# Patient Record
Sex: MALE | Race: WHITE | Employment: OTHER | URBAN - METROPOLITAN AREA
[De-identification: names, ages, dates, MRNs, and addresses within clinical notes are randomized per-mention and may not be internally consistent; named-entity substitution may affect disease eponyms.]

---

## 2017-02-10 DIAGNOSIS — G47.00 INSOMNIA, PERSISTENT: ICD-10-CM

## 2017-02-10 DIAGNOSIS — M25.512 ARTHRALGIA OF SHOULDER REGION, LEFT: ICD-10-CM

## 2017-02-10 DIAGNOSIS — M47.27 LUMBOSACRAL RADICULOPATHY DUE TO OSTEOARTHRITIS OF SPINE: ICD-10-CM

## 2017-02-10 RX ORDER — OXYCODONE HYDROCHLORIDE AND ACETAMINOPHEN 5; 325 MG/1; MG/1
1 TABLET ORAL 2 TIMES DAILY PRN
Qty: 60 TABLET | Refills: 0 | Status: SHIPPED | OUTPATIENT
Start: 2017-02-10 | End: 2017-03-12

## 2017-02-10 RX ORDER — ZOLPIDEM TARTRATE 5 MG/1
5 TABLET ORAL NIGHTLY PRN
Qty: 30 TABLET | Refills: 1 | Status: SHIPPED | OUTPATIENT
Start: 2017-02-10 | End: 2017-03-29 | Stop reason: SDUPTHER

## 2017-03-29 ENCOUNTER — OFFICE VISIT (OUTPATIENT)
Dept: FAMILY MEDICINE CLINIC | Age: 82
End: 2017-03-29

## 2017-03-29 DIAGNOSIS — K21.9 GASTROESOPHAGEAL REFLUX DISEASE WITHOUT ESOPHAGITIS: Primary | ICD-10-CM

## 2017-03-29 DIAGNOSIS — R53.81 OTHER MALAISE AND FATIGUE: ICD-10-CM

## 2017-03-29 DIAGNOSIS — M19.012 OSTEOARTHRITIS OF BOTH SHOULDERS, UNSPECIFIED OSTEOARTHRITIS TYPE: ICD-10-CM

## 2017-03-29 DIAGNOSIS — E55.9 VITAMIN D DEFICIENCY: ICD-10-CM

## 2017-03-29 DIAGNOSIS — R21 RASH: ICD-10-CM

## 2017-03-29 DIAGNOSIS — R53.83 OTHER MALAISE AND FATIGUE: ICD-10-CM

## 2017-03-29 DIAGNOSIS — G47.00 INSOMNIA, PERSISTENT: ICD-10-CM

## 2017-03-29 DIAGNOSIS — M19.011 OSTEOARTHRITIS OF BOTH SHOULDERS, UNSPECIFIED OSTEOARTHRITIS TYPE: ICD-10-CM

## 2017-03-29 DIAGNOSIS — L50.9 URTICARIA: ICD-10-CM

## 2017-03-29 LAB
T4 FREE: 1.21 NG/DL (ref 0.93–1.7)
TSH SERPL DL<=0.05 MIU/L-ACNC: 1.31 UIU/ML (ref 0.27–4.2)
VITAMIN B-12: 814 PG/ML (ref 211–946)
VITAMIN D 25-HYDROXY: 29.1 NG/ML (ref 30–100)

## 2017-03-29 PROCEDURE — G8420 CALC BMI NORM PARAMETERS: HCPCS | Performed by: FAMILY MEDICINE

## 2017-03-29 PROCEDURE — 1036F TOBACCO NON-USER: CPT | Performed by: FAMILY MEDICINE

## 2017-03-29 PROCEDURE — G8427 DOCREV CUR MEDS BY ELIG CLIN: HCPCS | Performed by: FAMILY MEDICINE

## 2017-03-29 PROCEDURE — G8484 FLU IMMUNIZE NO ADMIN: HCPCS | Performed by: FAMILY MEDICINE

## 2017-03-29 PROCEDURE — 4040F PNEUMOC VAC/ADMIN/RCVD: CPT | Performed by: FAMILY MEDICINE

## 2017-03-29 PROCEDURE — 99214 OFFICE O/P EST MOD 30 MIN: CPT | Performed by: FAMILY MEDICINE

## 2017-03-29 PROCEDURE — 1123F ACP DISCUSS/DSCN MKR DOCD: CPT | Performed by: FAMILY MEDICINE

## 2017-03-29 RX ORDER — ZOLPIDEM TARTRATE 5 MG/1
5 TABLET ORAL NIGHTLY PRN
Qty: 30 TABLET | Refills: 1 | Status: SHIPPED | OUTPATIENT
Start: 2017-03-29

## 2017-03-29 RX ORDER — OXYCODONE HYDROCHLORIDE AND ACETAMINOPHEN 5; 325 MG/1; MG/1
1 TABLET ORAL 2 TIMES DAILY PRN
COMMUNITY

## 2017-03-29 RX ORDER — OXYCODONE HYDROCHLORIDE AND ACETAMINOPHEN 5; 325 MG/1; MG/1
1 TABLET ORAL 2 TIMES DAILY PRN
Status: CANCELLED | OUTPATIENT
Start: 2017-03-29

## 2017-03-29 RX ORDER — ZOLPIDEM TARTRATE 5 MG/1
5 TABLET ORAL NIGHTLY PRN
Qty: 30 TABLET | Refills: 1 | Status: CANCELLED | OUTPATIENT
Start: 2017-03-29

## 2017-03-29 ASSESSMENT — PATIENT HEALTH QUESTIONNAIRE - PHQ9
SUM OF ALL RESPONSES TO PHQ9 QUESTIONS 1 & 2: 0
SUM OF ALL RESPONSES TO PHQ QUESTIONS 1-9: 0
2. FEELING DOWN, DEPRESSED OR HOPELESS: 0
1. LITTLE INTEREST OR PLEASURE IN DOING THINGS: 0

## 2017-03-29 ASSESSMENT — ENCOUNTER SYMPTOMS: BACK PAIN: 1

## 2017-04-01 LAB
ALLERGEN ASPERGILLUS ALTERNATA IGE: <0.1 KU/L
ALLERGEN ASPERGILLUS FUMIGATUS IGE: <0.1 KU/L
ALLERGEN BARLEY IGE: <0.1 KU/L
ALLERGEN BEEF: <0.1 KU/L
ALLERGEN CABBAGE IGE: <0.1 KU/L
ALLERGEN CANDIDA ALBICANS: 0.14 KU/L
ALLERGEN CARROT IGE: <0.1 KU/L
ALLERGEN CHICKEN IGE: <0.1 KU/L
ALLERGEN CODFISH IGE: <0.1 KU/L
ALLERGEN CORN IGE: <0.1 KU/L
ALLERGEN COW MILK IGE: <0.1 KU/L
ALLERGEN CRAB IGE: <0.1 KU/L
ALLERGEN EGG WHITE IGE: <0.1 KU/L
ALLERGEN GRAPE IGE: <0.1 KU/L
ALLERGEN HORMODENDRUM HORDEI IGE: <0.1 KU/L
ALLERGEN LETTUCE IGE: <0.1 KU/L
ALLERGEN NAVY BEAN: <0.1 KU/L
ALLERGEN OAT: <0.1 KU/L
ALLERGEN ORANGE IGE: <0.1 KU/L
ALLERGEN PEANUT (F13) IGE: <0.1 KU/L
ALLERGEN PENICILLIUM NOTATUM: <0.1 KU/L
ALLERGEN PORK: <0.1 KU/L
ALLERGEN POTATO IGE: <0.1 KU/L
ALLERGEN RICE IGE: <0.1 KU/L
ALLERGEN RYE IGE: <0.1 KU/L
ALLERGEN SEE NOTE: NORMAL
ALLERGEN SHRIMP IGE: <0.1 KU/L
ALLERGEN SOYBEAN IGE: <0.1 KU/L
ALLERGEN TOMATO IGE: <0.1 KU/L
ALLERGEN TUNA IGE: <0.1 KU/L
ALLERGEN WHEAT IGE: <0.1 KU/L
ALLERGEN, BELL PEPPER: <0.1 KU/L

## 2017-04-02 VITALS
DIASTOLIC BLOOD PRESSURE: 88 MMHG | OXYGEN SATURATION: 97 % | HEIGHT: 68 IN | BODY MASS INDEX: 25.78 KG/M2 | TEMPERATURE: 96.9 F | WEIGHT: 170.13 LBS | SYSTOLIC BLOOD PRESSURE: 138 MMHG | HEART RATE: 91 BPM

## 2017-04-05 DIAGNOSIS — E55.9 HYPOVITAMINOSIS D: ICD-10-CM

## 2017-04-05 RX ORDER — ERGOCALCIFEROL 1.25 MG/1
CAPSULE ORAL
Qty: 6 CAPSULE | Refills: 3 | Status: SHIPPED | OUTPATIENT
Start: 2017-04-05

## 2024-11-11 ENCOUNTER — APPOINTMENT (OUTPATIENT)
Dept: RADIOLOGY | Facility: HOSPITAL | Age: 89
End: 2024-11-11
Payer: MEDICARE

## 2024-11-11 ENCOUNTER — HOSPITAL ENCOUNTER (EMERGENCY)
Facility: HOSPITAL | Age: 89
Discharge: HOME | End: 2024-11-11
Attending: EMERGENCY MEDICINE
Payer: MEDICARE

## 2024-11-11 ENCOUNTER — HOSPITAL ENCOUNTER (EMERGENCY)
Facility: HOSPITAL | Age: 89
Discharge: HOME | End: 2024-11-11
Payer: MEDICARE

## 2024-11-11 VITALS
DIASTOLIC BLOOD PRESSURE: 74 MMHG | OXYGEN SATURATION: 97 % | RESPIRATION RATE: 16 BRPM | SYSTOLIC BLOOD PRESSURE: 126 MMHG | HEART RATE: 97 BPM

## 2024-11-11 VITALS
RESPIRATION RATE: 20 BRPM | SYSTOLIC BLOOD PRESSURE: 138 MMHG | DIASTOLIC BLOOD PRESSURE: 72 MMHG | HEART RATE: 85 BPM | OXYGEN SATURATION: 100 %

## 2024-11-11 DIAGNOSIS — Z51.89 VISIT FOR WOUND CHECK: Primary | ICD-10-CM

## 2024-11-11 DIAGNOSIS — S12.101A CLOSED NONDISPLACED FRACTURE OF SECOND CERVICAL VERTEBRA, UNSPECIFIED FRACTURE MORPHOLOGY, INITIAL ENCOUNTER: ICD-10-CM

## 2024-11-11 DIAGNOSIS — S01.01XA LACERATION OF SCALP, INITIAL ENCOUNTER: ICD-10-CM

## 2024-11-11 DIAGNOSIS — S09.90XA HEAD INJURY, INITIAL ENCOUNTER: Primary | ICD-10-CM
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
## 2024-11-11 LAB
ALBUMIN SERPL BCP-MCNC: 4.3 G/DL (ref 3.4–5)
ALP SERPL-CCNC: 56 U/L (ref 33–136)
ALT SERPL W P-5'-P-CCNC: 14 U/L (ref 10–52)
ANION GAP SERPL CALC-SCNC: 12 MMOL/L (ref 10–20)
APTT PPP: 32 SECONDS (ref 27–38)
AST SERPL W P-5'-P-CCNC: 20 U/L (ref 9–39)
BASOPHILS # BLD AUTO: 0.04 X10*3/UL (ref 0–0.1)
BASOPHILS NFR BLD AUTO: 0.5 %
BILIRUB SERPL-MCNC: 0.7 MG/DL (ref 0–1.2)
BUN SERPL-MCNC: 41 MG/DL (ref 6–23)
CALCIUM SERPL-MCNC: 8.9 MG/DL (ref 8.6–10.3)
CHLORIDE SERPL-SCNC: 101 MMOL/L (ref 98–107)
CO2 SERPL-SCNC: 29 MMOL/L (ref 21–32)
CREAT SERPL-MCNC: 1.67 MG/DL (ref 0.5–1.3)
EGFRCR SERPLBLD CKD-EPI 2021: 39 ML/MIN/1.73M*2
EOSINOPHIL # BLD AUTO: 0.19 X10*3/UL (ref 0–0.4)
EOSINOPHIL NFR BLD AUTO: 2.6 %
ERYTHROCYTE [DISTWIDTH] IN BLOOD BY AUTOMATED COUNT: 14.8 % (ref 11.5–14.5)
GLUCOSE SERPL-MCNC: 123 MG/DL (ref 74–99)
HCT VFR BLD AUTO: 38.9 % (ref 41–52)
HGB BLD-MCNC: 12.6 G/DL (ref 13.5–17.5)
IMM GRANULOCYTES # BLD AUTO: 0.11 X10*3/UL (ref 0–0.5)
IMM GRANULOCYTES NFR BLD AUTO: 1.5 % (ref 0–0.9)
INR PPP: 1.1 (ref 0.9–1.1)
LYMPHOCYTES # BLD AUTO: 1.16 X10*3/UL (ref 0.8–3)
LYMPHOCYTES NFR BLD AUTO: 15.8 %
MCH RBC QN AUTO: 30.6 PG (ref 26–34)
MCHC RBC AUTO-ENTMCNC: 32.4 G/DL (ref 32–36)
MCV RBC AUTO: 94 FL (ref 80–100)
MONOCYTES # BLD AUTO: 0.78 X10*3/UL (ref 0.05–0.8)
MONOCYTES NFR BLD AUTO: 10.6 %
NEUTROPHILS # BLD AUTO: 5.05 X10*3/UL (ref 1.6–5.5)
NEUTROPHILS NFR BLD AUTO: 69 %
NRBC BLD-RTO: 0 /100 WBCS (ref 0–0)
PLATELET # BLD AUTO: 121 X10*3/UL (ref 150–450)
POTASSIUM SERPL-SCNC: 4 MMOL/L (ref 3.5–5.3)
PROT SERPL-MCNC: 7 G/DL (ref 6.4–8.2)
PROTHROMBIN TIME: 12.4 SECONDS (ref 9.8–12.8)
RBC # BLD AUTO: 4.12 X10*6/UL (ref 4.5–5.9)
SODIUM SERPL-SCNC: 138 MMOL/L (ref 136–145)
WBC # BLD AUTO: 7.3 X10*3/UL (ref 4.4–11.3)

## 2024-11-11 PROCEDURE — 2500000004 HC RX 250 GENERAL PHARMACY W/ HCPCS (ALT 636 FOR OP/ED)

## 2024-11-11 PROCEDURE — 12054 INTMD RPR FACE/MM 7.6-12.5CM: CPT

## 2024-11-11 PROCEDURE — 72125 CT NECK SPINE W/O DYE: CPT | Performed by: RADIOLOGY

## 2024-11-11 PROCEDURE — 70450 CT HEAD/BRAIN W/O DYE: CPT

## 2024-11-11 PROCEDURE — 85610 PROTHROMBIN TIME: CPT

## 2024-11-11 PROCEDURE — 99282 EMERGENCY DEPT VISIT SF MDM: CPT | Mod: 25

## 2024-11-11 PROCEDURE — 99285 EMERGENCY DEPT VISIT HI MDM: CPT | Mod: 25

## 2024-11-11 PROCEDURE — 90715 TDAP VACCINE 7 YRS/> IM: CPT

## 2024-11-11 PROCEDURE — 72170 X-RAY EXAM OF PELVIS: CPT | Performed by: RADIOLOGY

## 2024-11-11 PROCEDURE — 76377 3D RENDER W/INTRP POSTPROCES: CPT | Performed by: RADIOLOGY

## 2024-11-11 PROCEDURE — 71045 X-RAY EXAM CHEST 1 VIEW: CPT | Performed by: RADIOLOGY

## 2024-11-11 PROCEDURE — 72125 CT NECK SPINE W/O DYE: CPT

## 2024-11-11 PROCEDURE — 76377 3D RENDER W/INTRP POSTPROCES: CPT

## 2024-11-11 PROCEDURE — 36415 COLL VENOUS BLD VENIPUNCTURE: CPT

## 2024-11-11 PROCEDURE — 70486 CT MAXILLOFACIAL W/O DYE: CPT

## 2024-11-11 PROCEDURE — 85025 COMPLETE CBC W/AUTO DIFF WBC: CPT

## 2024-11-11 PROCEDURE — 72170 X-RAY EXAM OF PELVIS: CPT

## 2024-11-11 PROCEDURE — 82565 ASSAY OF CREATININE: CPT

## 2024-11-11 PROCEDURE — 90471 IMMUNIZATION ADMIN: CPT

## 2024-11-11 PROCEDURE — 12004 RPR S/N/AX/GEN/TRK7.6-12.5CM: CPT

## 2024-11-11 PROCEDURE — 70486 CT MAXILLOFACIAL W/O DYE: CPT | Performed by: RADIOLOGY

## 2024-11-11 PROCEDURE — 71045 X-RAY EXAM CHEST 1 VIEW: CPT

## 2024-11-11 PROCEDURE — 12005 RPR S/N/A/GEN/TRK12.6-20.0CM: CPT

## 2024-11-11 PROCEDURE — 70450 CT HEAD/BRAIN W/O DYE: CPT | Performed by: RADIOLOGY

## 2024-11-11 RX ORDER — ACETAMINOPHEN 500 MG
1000 TABLET ORAL EVERY 6 HOURS PRN
Qty: 30 TABLET | Refills: 0 | Status: SHIPPED | OUTPATIENT
Start: 2024-11-11 | End: 2024-11-18

## 2024-11-11 RX ORDER — BACITRACIN ZINC 500 UNIT/G
1 OINTMENT (GRAM) TOPICAL 2 TIMES DAILY
Qty: 1 G | Refills: 0 | Status: SHIPPED | OUTPATIENT
Start: 2024-11-11 | End: 2024-11-16

## 2024-11-11 RX ORDER — BACITRACIN 500 [USP'U]/G
OINTMENT TOPICAL ONCE
Status: DISCONTINUED | OUTPATIENT
Start: 2024-11-11 | End: 2024-11-11 | Stop reason: HOSPADM

## 2024-11-11 RX ORDER — LIDOCAINE HYDROCHLORIDE 10 MG/ML
10 INJECTION, SOLUTION INFILTRATION; PERINEURAL ONCE
Status: COMPLETED | OUTPATIENT
Start: 2024-11-11 | End: 2024-11-11

## 2024-11-11 ASSESSMENT — COLUMBIA-SUICIDE SEVERITY RATING SCALE - C-SSRS
2. HAVE YOU ACTUALLY HAD ANY THOUGHTS OF KILLING YOURSELF?: NO
1. IN THE PAST MONTH, HAVE YOU WISHED YOU WERE DEAD OR WISHED YOU COULD GO TO SLEEP AND NOT WAKE UP?: NO
2. HAVE YOU ACTUALLY HAD ANY THOUGHTS OF KILLING YOURSELF?: NO
1. IN THE PAST MONTH, HAVE YOU WISHED YOU WERE DEAD OR WISHED YOU COULD GO TO SLEEP AND NOT WAKE UP?: NO
6. HAVE YOU EVER DONE ANYTHING, STARTED TO DO ANYTHING, OR PREPARED TO DO ANYTHING TO END YOUR LIFE?: NO
6. HAVE YOU EVER DONE ANYTHING, STARTED TO DO ANYTHING, OR PREPARED TO DO ANYTHING TO END YOUR LIFE?: NO

## 2024-11-11 ASSESSMENT — LIFESTYLE VARIABLES
HAVE YOU EVER FELT YOU SHOULD CUT DOWN ON YOUR DRINKING: NO
HAVE PEOPLE ANNOYED YOU BY CRITICIZING YOUR DRINKING: NO
TOTAL SCORE: 0
HAVE PEOPLE ANNOYED YOU BY CRITICIZING YOUR DRINKING: NO
EVER HAD A DRINK FIRST THING IN THE MORNING TO STEADY YOUR NERVES TO GET RID OF A HANGOVER: NO
EVER FELT BAD OR GUILTY ABOUT YOUR DRINKING: NO
TOTAL SCORE: 0
EVER HAD A DRINK FIRST THING IN THE MORNING TO STEADY YOUR NERVES TO GET RID OF A HANGOVER: NO
EVER FELT BAD OR GUILTY ABOUT YOUR DRINKING: NO
HAVE YOU EVER FELT YOU SHOULD CUT DOWN ON YOUR DRINKING: NO

## 2024-11-11 ASSESSMENT — PAIN DESCRIPTION - DESCRIPTORS
DESCRIPTORS: ACHING

## 2024-11-11 ASSESSMENT — PAIN DESCRIPTION - PAIN TYPE
TYPE: ACUTE PAIN
TYPE: ACUTE PAIN

## 2024-11-11 ASSESSMENT — PAIN SCALES - GENERAL
PAINLEVEL_OUTOF10: 5 - MODERATE PAIN
PAINLEVEL_OUTOF10: 0 - NO PAIN
PAINLEVEL_OUTOF10: 5 - MODERATE PAIN
PAINLEVEL_OUTOF10: 5 - MODERATE PAIN

## 2024-11-11 ASSESSMENT — ENCOUNTER SYMPTOMS: HEADACHES: 1

## 2024-11-11 ASSESSMENT — PAIN DESCRIPTION - LOCATION
LOCATION: HEAD
LOCATION: HEAD

## 2024-11-11 ASSESSMENT — PAIN - FUNCTIONAL ASSESSMENT
PAIN_FUNCTIONAL_ASSESSMENT: 0-10

## 2024-11-11 ASSESSMENT — PAIN DESCRIPTION - FREQUENCY
FREQUENCY: CONSTANT/CONTINUOUS
FREQUENCY: CONSTANT/CONTINUOUS

## 2024-11-11 NOTE — ED PROVIDER NOTES
HPI   Chief Complaint   Patient presents with    Head Laceration     Here earlier and now wounds bleeding on head.       History provided by: Patient    Limitations to history: None    CC: Wound check    HPI: 89-year-old male presents the emergency department to be evaluated for wound check.  Patient was seen in the emergency department earlier today for a large forehead and scalp laceration after a mechanical fall.  He was also diagnosed with a C1 and C2 fracture and was immobilized in a soft collar.  He was medically cleared by neurosurgery and trauma.  I am familiar with the patient as I am the provider who saw him during his original presentation.  He required 16 simple interrupted sutures for his large laceration.  Patient presents the emergency department today as part of the laceration has been continuously oozing blood since it was repaired with sutures.  He is anticoagulant on Eliquis due to his history of A-fib.  Denies headache, neck pain, vision changes.  Denies lightheadedness and dizziness.  Denies numbness tingling or weakness in the extremities.  Able to walk without difficulty.  The bleeding is controlled with pressure we will start oozing as soon as pressure is removed.    ROS: Negative unless mentioned in HPI    Medical Hx: Denies allergies.  Immunizations up-to-date.    Physical exam:    Constitutional: Patient is well-nourished and well-developed.  Sitting comfortably in the room and in no distress.  Oriented to person, place, time, and situation.    HEENT: Head is normocephalic. Patient's airway is patent.  Tympanic membranes are clear bilaterally.  Nasal mucosa clear.  Mouth with normal mucosa.  Throat is not erythematous and there are no oropharyngeal exudates, uvula is midline.  Patient has a large approximate 12 cm laceration of his left forehead and scalp.  At the most inferior aspect of the laceration, there is a small amount of nonmaterial blood oozing from the area.  Patient has another  area similar to that slightly superior.     Eyes: Clear bilaterally.  Pupils are equal round and reactive to light and accommodation.  Extraocular movements intact.      Cardiac: Regular rate, regular rhythm.  Heart sounds S1, S2.  No murmurs, rubs, or gallops.  PMI nondisplaced.  No JVD.    Respiratory: Regular respiratory rate and effort.  Breath sounds are clear and equal bilaterally, no adventitious lung sounds.  Patient is speaking in full sentences and is in no apparent respiratory distress. No use of accessory muscles.      Gastrointestinal: Abdomen is soft, nondistended, and nontender.  There are no obvious deformities.  No rebound tenderness or guarding.  Bowel sounds are normal active.    Genitourinary: No CVA or flank tenderness.    Musculoskeletal: No reproducible tenderness.  No obvious bony deformities.  Patient has equal range of motion in all extremities and no strength deficiencies.  No muscle or joint tenderness. No back or neck tenderness.  Capillary refill less than 3 seconds.  Strong peripheral pulses.  No sensory deficits.    Neurological: Patient is alert and oriented.  No focal deficits.  5/5 strength in all extremities.  Cranial nerves II through XII intact. GCS15.     Skin: Skin is normal color for race and is warm, dry, and intact.  See head exam for more details.    Psych: Appropriate mood and affect.  No apparent risk to self or others.    Heme/lymph: No adenopathy, lymphadenopathy, or splenomegaly    Physical exam is otherwise negative unless stated above or in history of present illness.      Patient updated on plan for lab testing, IV insertion, radiology imaging, and medications to be administered while in the ER (if indicated). Patient updated on expected wait times for testing and results. Patient provided my name and told to ask any staff member for questions or concerns if they should arise. Electronic medical record reviewed.     MDM    Upon initial assessment, patient was  healthy non-toxic appearing and in no apparent distress.     Patient presented to the emergency department with the chief complaint wound check. Head is normocephalic. Patient's airway is patent.  Tympanic membranes are clear bilaterally.  Nasal mucosa clear.  Mouth with normal mucosa.  Throat is not erythematous and there are no oropharyngeal exudates, uvula is midline.  Patient has a large approximate 12 cm laceration of his left forehead and scalp.  At the most inferior aspect of the laceration, there is a small amount of nonmaterial blood oozing from the area.  Patient has another area similar to that slightly superior.  On arrival to the emergency department, vital signs were within normal limits    The area was cleaned and I locally anesthetized the area with lidocaine with epinephrine.  I then placed 2 more simple interrupted sutures and the bleeding now appears to be controlled without any pressure.  Will monitor the patient to evaluate for any signs of recurring bleeding.    Patient continues to have no further bleeding.  Patient will be discharged with discharge instructions discussed during his earlier evaluation.  Will follow-up with his PCP to have his sutures removed and with neurosurgery as an outpatient.  Discussed continuing wound care including signs of infection keeping area clean.  All questions and concerns addressed.  Reasons to return to ER discussed.  Patient verbalized understanding and agreement with the treatment plan and they remained hemodynamically stable in the ER.    This note was dictated using a speech recognition program.  While an attempt was made at proof-reading to minimize errors, minor errors in transcription may be present              Patient History   Past Medical History:   Diagnosis Date    Benign prostatic hyperplasia without lower urinary tract symptoms     Enlarged prostate without lower urinary tract symptoms (luts)    Encounter for general adult medical examination  without abnormal findings     Health care maintenance    Gastro-esophageal reflux disease with esophagitis, without bleeding     Gastroesophageal reflux disease with esophagitis    Incontinence without sensory awareness     Urinary incontinence without sensory awareness    Localized swelling, mass and lump, unspecified upper limb     Axillary mass    Other conditions influencing health status     History of cough    Other nonspecific abnormal finding of lung field     Lung mass    Personal history of diseases of the skin and subcutaneous tissue     History of atopic dermatitis    Personal history of non-Hodgkin lymphomas 05/13/2019    History of lymphoma    Personal history of other diseases of the circulatory system     History of hypertension    Personal history of other diseases of the digestive system     History of hiatal hernia    Personal history of other diseases of the musculoskeletal system and connective tissue     History of back pain    Personal history of other diseases of the nervous system and sense organs 06/08/2020    History of restless legs syndrome    Personal history of other drug therapy     History of drug therapy    Personal history of other endocrine, nutritional and metabolic disease     History of hyperlipidemia    Personal history of other specified conditions     History of abdominal pain    Personal history of other specified conditions     History of urinary incontinence    Personal history of other specified conditions 05/13/2019    History of insomnia    Personal history of urinary (tract) infections     History of urinary tract infection    Unspecified osteoarthritis, unspecified site     Osteoarthritis     Past Surgical History:   Procedure Laterality Date    APPENDECTOMY  03/28/2018    Appendectomy    CATARACT EXTRACTION  03/28/2018    Cataract Surgery    HERNIA REPAIR  03/28/2018    Hernia Repair    TONSILLECTOMY  03/28/2018    Tonsillectomy     No family history on  file.  Social History     Tobacco Use    Smoking status: Never     Passive exposure: Never    Smokeless tobacco: Never   Vaping Use    Vaping status: Never Used   Substance Use Topics    Alcohol use: Defer    Drug use: Never       Physical Exam   ED Triage Vitals [11/11/24 1731]   Temp Heart Rate Respirations BP   -- 85 20 138/72      Pulse Ox Temp src Heart Rate Source Patient Position   100 % -- Monitor Sitting      BP Location FiO2 (%)     Right arm --       Physical Exam      ED Course & MDM   Diagnoses as of 11/11/24 1828   Visit for wound check                 No data recorded     [place] Coma Scale Score: 15 (11/11/24 1727 : Yannick Moore, AME)                           Medical Decision Making      Procedure  Procedures     Vaibhav Charles PA-C  11/11/24 1828

## 2024-11-11 NOTE — NURSING NOTE
Rapid response called for fall by nurses station UA pt found in care of [facility] [place] wrapped Rns state that pt has laceration to head and both hands.  Pt in wheel chair being taken to ED for further evaluation.  Bt A&O x 4 but poor historian.  Pt sates he has pacemaker and only complains of a head ache states he doesn't take blood thinners.  UA in ED spoke to MANUEL Hernández and advised him of possible need for HIA due to pacemaker and possible blood thinners.  Pt placed on bed states he is having pain in his neck.  Head manually stabilized and C collar placed on pt.  Pt placed in ER bed. wound evaluated and due to severity limited trauma called.  Pt registered and medically history showed pt on blood thinners pt also treated for HIA by ER staff.

## 2024-11-11 NOTE — H&P
[facility]  TRAUMA SERVICE - HISTORY AND PHYSICAL / CONSULT    Patient Name: Mir Lynch  MRN: [id number]  Admit Date: [id number]  : 1934  AGE: 89 y.o.   GENDER: male  ==============================================================================  MECHANISM OF INJURY / CHIEF COMPLAINT:   Patient is a 9-year-old male with past medical history of hypertension, BPH, CHF, hyperlipidemia, A-fib and internal defibrillator/pacemaker who presented to the emergency department as a limited trauma due to fall.  Patient was on [address] [place] to visit his girlfriend when he states he tripped over his own feet causing him to fall and hit his forehead.  He states he has a headache.  Denies chest pain.  Denies shortness of breath.  Denies abdominal pain.  Denies a history of frequent falls.    LOC (yes/no?): Denies  Anticoagulant / Anti-platelet Rx? (for what dx?): Eliquis  Referring Facility Name (N/A for scene EMR run): N/A    INJURIES:   Laceration to scalp and forehead  Abrasions to bilateral hands  Headache    OTHER MEDICAL PROBLEMS:  CHF  Hypertension  A-fib    INCIDENTAL FINDINGS:  N/A    ==============================================================================  ADMISSION PLAN OF CARE:  Pending disposition based on further evaluation and management by emergency medicine attending physician and in concert with trauma attending physician.  ==============================================================================  PAST MEDICAL HISTORY:   PMH:   Past Medical History:   Diagnosis Date    Benign prostatic hyperplasia without lower urinary tract symptoms     Enlarged prostate without lower urinary tract symptoms (luts)    Encounter for general adult medical examination without abnormal findings     Health care maintenance    Gastro-esophageal reflux disease with esophagitis, without bleeding     Gastroesophageal reflux disease with esophagitis    Incontinence without sensory awareness      Urinary incontinence without sensory awareness    Localized swelling, mass and lump, unspecified upper limb     Axillary mass    Other conditions influencing health status     History of cough    Other nonspecific abnormal finding of lung field     Lung mass    Personal history of diseases of the skin and subcutaneous tissue     History of atopic dermatitis    Personal history of non-Hodgkin lymphomas 05/13/2019    History of lymphoma    Personal history of other diseases of the circulatory system     History of hypertension    Personal history of other diseases of the digestive system     History of hiatal hernia    Personal history of other diseases of the musculoskeletal system and connective tissue     History of back pain    Personal history of other diseases of the nervous system and sense organs 06/08/2020    History of restless legs syndrome    Personal history of other drug therapy     History of drug therapy    Personal history of other endocrine, nutritional and metabolic disease     History of hyperlipidemia    Personal history of other specified conditions     History of abdominal pain    Personal history of other specified conditions     History of urinary incontinence    Personal history of other specified conditions 05/13/2019    History of insomnia    Personal history of urinary (tract) infections     History of urinary tract infection    Unspecified osteoarthritis, unspecified site     Osteoarthritis       PSH:   Past Surgical History:   Procedure Laterality Date    APPENDECTOMY  03/28/2018    Appendectomy    CATARACT EXTRACTION  03/28/2018    Cataract Surgery    HERNIA REPAIR  03/28/2018    Hernia Repair    TONSILLECTOMY  03/28/2018    Tonsillectomy     FH:   No family history on file.    SOCIAL HISTORY:    Smoking: Denies  Social History     Tobacco Use   Smoking Status Not on file   Smokeless Tobacco Not on file       Alcohol: Denies  Social History     Substance and Sexual Activity   Alcohol  Use None       Drug use: Denies    MEDICATIONS:  Per chart review  trospium (SANCTURA) 20 mg tablet Take 20 mg by mouth once daily.   apixaban (ELIQUIS) 2.5 mg tab(s) Take 1 tablet by mouth two times a day.   albuterol HFA (PROVENTIL HFA, VENTOLIN HFA) 90 mcg/actuation inhaler Inhale 2 Puffs as instructed every 4 hours as needed for wheezing/shortness of breath.   digoxin (LANOXIN) 125 mcg (0.125 mg) tablet Take one tablet every Monday, Wednesday, and Friday (Dr. Oliveira, [facility])   rOPINIRole (REQUIP) 1 mg tablet Take 1 mg with dinner, and take 2 mgs at bedtime (Dr. Oliveira, [facility])   tamsulosin (FLOMAX) 0.4 mg Take 0.4 mg by mouth once daily.   cyanocobalamin (VITAMIN B-12) 100 mcg tab Take 1 tablet by mouth once daily.   Ipratropium (ATROVENT) 17 mcg/actuation inhaler INHALE 2 PUFFS BY MOUTH FOUR TIMES A DAY FOR BRONCHOSPASM PREVENTION WITH COPD   Omega-3 Fatty Acids, FISH OIL, (FISH OIL) 360-1,200 mg cap Take 1 capsule by mouth daily with breakfast.   metoprolol succinate ER (TOPROL XL) 25 mg 24 hr tablet Take 1 tablet by mouth two times a day.   torsemide (DEMADEX) 20 mg tablet Take 1 tablet by mouth once daily.   Ipratropium (ATROVENT HFA) 17 mcg/actuation inhaler Inhale 2 Puffs as instructed every 4 hours as needed.   Vit A,C,E-Zinc-Copper (PRESERVISION AREDS) 4,296 mcg-226 mg-90 mg cap Take 1 capsule by mouth two times a day.   sacubitril-valsartan (ENTRESTO) 24-26 mg tablet Take 1/2 tablet by mouth twice daily.   spironolactone (ALDACTONE) 25 mg tablet Take 1/2 tablet by mouth daily at bedtime.   carboxymethylcellulose sodium (REFRESH LIQUIGEL) 1 % dlgl Use 1 Drop in both eyes every 3 hours. (Patient taking differently: Use 1 Drop in both eyes as needed.)   multivit-min-FA-lycopen-lutein (CENTRUM SILVER MEN) 300-600-300 mcg tab Take 1 tablet by mouth once daily.   omeprazole (PRILOSEC) 20 mg ORAL CpDR Take by mouth once daily.   vitamin B complex (B COMPLEX-100 ORAL) Take by mouth.   oxybutynin XL (DITROPAN XL)  5 mg 24 hr tablet Take 1 tablet by mouth once daily. (Patient not taking: Reported on 10/25/2024)   levothyroxine (SYNTHROID) 50 mcg tablet Take 1 tablet by mouth once daily. Repeat lab in 6 weeks to determine ongoing dose.   guaiFENesin (MUCINEX) 600 mg 12 hr tablet Take 1 tablet by mouth two times a day as needed for cold/allergy symptoms. (Patient not taking: Reported on 10/25/2024)   glucosamine/chondroitin/C/Hank (GLUCOSAMINE 1500 COMPLEX ORAL) Take 1 tablet by mouth two times a day. (Patient not taking: Reported on 10/25/2024)   hyoscyamine sublingual (LEVSIN/SL) 0.125 mg Dissolve 1 tablet under the tongue every 4 hours as needed for up to 3 days.     ALLERGIES:  Allergen Reactions   Levofloxacin Intolerance-Insomnia     REVIEW OF SYSTEMS:  Review of Systems   Neurological:  Positive for headaches.   All other systems reviewed and are negative.    PHYSICAL EXAM:  PRIMARY SURVEY:  Primary Survey  Please see ED providers note for primary survey    SECONDARY SURVEY/PHYSICAL EXAM:  Physical Exam  Vitals reviewed.   Constitutional:       General: He is not in acute distress.  HENT:      Head: Laceration present.        Right Ear: External ear normal.      Left Ear: External ear normal.      Nose: Nose normal.      Mouth/Throat:      Mouth: Mucous membranes are moist.   Eyes:      Extraocular Movements: Extraocular movements intact.      Pupils: Pupils are equal, round, and reactive to light.   Neck:      Comments: C-collar  Cardiovascular:      Rate and Rhythm: Normal rate.   Pulmonary:      Effort: Pulmonary effort is normal.   Abdominal:      General: Abdomen is flat. Bowel sounds are normal.      Palpations: Abdomen is soft.   Skin:     General: Skin is warm and dry.      Capillary Refill: Capillary refill takes less than 2 seconds.      Findings: Abrasion present.      Comments: Abrasion to bilateral hands   Neurological:      General: No focal deficit present.      Mental Status: He is alert and oriented to  person, place, and time.      GCS: GCS eye subscore is 4. GCS verbal subscore is 5. GCS motor subscore is 6.   Psychiatric:         Mood and Affect: Mood normal.       IMAGING SUMMARY:  (summary of findings, not a copy of dictation)  CT Head/Face: Negative. Does show laceration   CT C-Spine: Fx C1-C2  CT Chest/Abd/Pelvis: N/A  CXR/PXR: Questionable nondisplaced right ischial fracture seen on one view     LABS:  Results for orders placed or performed during the hospital encounter of 11/11/24 (from the past 24 hours)   CBC and Auto Differential   Result Value Ref Range    WBC 7.3 4.4 - 11.3 x10*3/uL    nRBC 0.0 0.0 - 0.0 /100 WBCs    RBC 4.12 (L) 4.50 - 5.90 x10*6/uL    Hemoglobin 12.6 (L) 13.5 - 17.5 g/dL    Hematocrit 38.9 (L) 41.0 - 52.0 %    MCV 94 80 - 100 fL    MCH 30.6 26.0 - 34.0 pg    MCHC 32.4 32.0 - 36.0 g/dL    RDW 14.8 (H) 11.5 - 14.5 %    Platelets 121 (L) 150 - 450 x10*3/uL    Neutrophils % 69.0 40.0 - 80.0 %    Immature Granulocytes %, Automated 1.5 (H) 0.0 - 0.9 %    Lymphocytes % 15.8 13.0 - 44.0 %    Monocytes % 10.6 2.0 - 10.0 %    Eosinophils % 2.6 0.0 - 6.0 %    Basophils % 0.5 0.0 - 2.0 %    Neutrophils Absolute 5.05 1.60 - 5.50 x10*3/uL    Immature Granulocytes Absolute, Automated 0.11 0.00 - 0.50 x10*3/uL    Lymphocytes Absolute 1.16 0.80 - 3.00 x10*3/uL    Monocytes Absolute 0.78 0.05 - 0.80 x10*3/uL    Eosinophils Absolute 0.19 0.00 - 0.40 x10*3/uL    Basophils Absolute 0.04 0.00 - 0.10 x10*3/uL   Comprehensive metabolic panel   Result Value Ref Range    Glucose 123 (H) 74 - 99 mg/dL    Sodium 138 136 - 145 mmol/L    Potassium 4.0 3.5 - 5.3 mmol/L    Chloride 101 98 - 107 mmol/L    Bicarbonate 29 21 - 32 mmol/L    Anion Gap 12 10 - 20 mmol/L    Urea Nitrogen 41 (H) 6 - 23 mg/dL    Creatinine 1.67 (H) 0.50 - 1.30 mg/dL    eGFR 39 (L) >60 mL/min/1.73m*2    Calcium 8.9 8.6 - 10.3 mg/dL    Albumin 4.3 3.4 - 5.0 g/dL    Alkaline Phosphatase 56 33 - 136 U/L    Total Protein 7.0 6.4 - 8.2 g/dL     AST 20 9 - 39 U/L    Bilirubin, Total 0.7 0.0 - 1.2 mg/dL    ALT 14 10 - 52 U/L   Coagulation Screen   Result Value Ref Range    Protime 12.4 9.8 - 12.8 seconds    INR 1.1 0.9 - 1.1    aPTT 32 27 - 38 seconds       I have reviewed all laboratory and imaging results ordered/pertinent for this encounter.      ABIMAEL Jacobson-CNP      Time spent  60  minutes obtaining labs, imaging, recommendations, interview, assessment, examination, medication review/ordering, and EMR review.    Plan of care was discussed extensively with patient. Patient verbalized understanding through teach back method. All questions and concerns addressed upon examination.     Of note, this documentation is completed using the Dragon Dictation system (voice recognition software). There may be spelling and/or grammatical errors that were not corrected prior to final submission.

## 2024-11-11 NOTE — ED PROVIDER NOTES
HPI   Chief Complaint   Patient presents with    Head Injury     Fall walking down jennings and tripped per pt. Pt is on blood thinners, no LOC. Pt c/o head and neck pain. Pt denies, CP,SOB, and dizziness.        History provided by: Patient    Limitations to history: None    CC: Fall    HPI: 89-year-old male with a history of BPH, GERD, CHF, hypertension, hyperlipidemia, osteoarthritis, obstructive sleep apnea, paroxysmal A-fib presents to the emergency department as an HIA.  Patient had a mechanical fall while visiting a patient upstairs.  Patient states he was walking when he tripped and fell over his own feet causing him to hit his forehead.  Reports a large laceration over his scalp and forehead.  Bleeding is controlled at a pressure.  Patient is on anticoagulation on Eliquis due to his history of A-fib.  He also has an internal defibrillator and pacemaker.  When he fell, he did not lose consciousness.  He reports a few skin tears over the posterior aspect of his bilateral hands, denies pain in this area.  He denies weakness and fatigue.  Denies history of frequent falls.  Denies chest pain or shortness of breath.  He denies history of CAD, COPD.  Denies nausea vomiting, diarrhea and constipation, blood in the urine or stool.  Denies urgency frequency and dysuria.  Patient is confident this was a mechanical fall.  He denies all other systemic symptoms.    ROS: Negative unless mentioned in HPI    Medical Hx: Denies allergies.  Patient is unsure of his tetanus status.    Physical exam:    Constitutional: Patient is well-nourished and well-developed.  Sitting comfortably in the room and in no distress.  Oriented to person, place, time, and situation.    HEENT: Head is normocephalic, patient does have a scalp hematoma near the superior aspect of his laceration near the left frontal/parietal scalp.  Patient's airway is patent.  Tympanic membranes are clear bilaterally.  Nasal mucosa clear.  Mouth with normal mucosa.   Throat is not erythematous and there are no oropharyngeal exudates, uvula is midline.  Patient has a large approximate 12 cm gaping laceration that starts in the left frontal scalp and extends to his forehead.  No active bleeding.  No foreign bodies.    Eyes: Clear bilaterally.  Pupils are equal round and reactive to light and accommodation.  Extraocular movements intact.      Cardiac: Regular rate, regular rhythm.  Heart sounds S1, S2.  No murmurs, rubs, or gallops.  PMI nondisplaced.  No JVD.    Respiratory: Regular respiratory rate and effort.  Breath sounds are clear and equal bilaterally, no adventitious lung sounds.  Patient is speaking in full sentences and is in no apparent respiratory distress. No use of accessory muscles.      Gastrointestinal: Abdomen is soft, nondistended, and nontender.  There are no obvious deformities.  No rebound tenderness or guarding.  Bowel sounds are normal active.    Genitourinary: No CVA or flank tenderness.    Musculoskeletal: No reproducible tenderness.  No obvious bony deformities.  Patient has equal range of motion in all extremities and no strength deficiencies.  No muscle or joint tenderness. No back or neck tenderness.  Capillary refill less than 3 seconds.  Strong peripheral pulses.  No sensory deficits.    Neurological: Patient is alert and oriented.  No focal deficits.  5/5 strength in all extremities.  Cranial nerves II through XII intact. GCS15.     Skin: Skin is normal color for race and is warm, dry. Patient has a few small less than 1 cm skin tears over the dorsal aspect of the left hand near the fifth metacarpal neck and a few less than 1 cm skin tears over the dorsal aspect of the right hand near the base of the thumb.  No active bleeding.  No foreign bodies.    Psych: Appropriate mood and affect.  No apparent risk to self or others.    Heme/lymph: No adenopathy, lymphadenopathy, or splenomegaly    Physical exam is otherwise negative unless stated above or in  history of present illness.              Patient History   Past Medical History:   Diagnosis Date    Benign prostatic hyperplasia without lower urinary tract symptoms     Enlarged prostate without lower urinary tract symptoms (luts)    Encounter for general adult medical examination without abnormal findings     Health care maintenance    Gastro-esophageal reflux disease with esophagitis, without bleeding     Gastroesophageal reflux disease with esophagitis    Incontinence without sensory awareness     Urinary incontinence without sensory awareness    Localized swelling, mass and lump, unspecified upper limb     Axillary mass    Other conditions influencing health status     History of cough    Other nonspecific abnormal finding of lung field     Lung mass    Personal history of diseases of the skin and subcutaneous tissue     History of atopic dermatitis    Personal history of non-Hodgkin lymphomas 05/13/2019    History of lymphoma    Personal history of other diseases of the circulatory system     History of hypertension    Personal history of other diseases of the digestive system     History of hiatal hernia    Personal history of other diseases of the musculoskeletal system and connective tissue     History of back pain    Personal history of other diseases of the nervous system and sense organs 06/08/2020    History of restless legs syndrome    Personal history of other drug therapy     History of drug therapy    Personal history of other endocrine, nutritional and metabolic disease     History of hyperlipidemia    Personal history of other specified conditions     History of abdominal pain    Personal history of other specified conditions     History of urinary incontinence    Personal history of other specified conditions 05/13/2019    History of insomnia    Personal history of urinary (tract) infections     History of urinary tract infection    Unspecified osteoarthritis, unspecified site     Osteoarthritis      Past Surgical History:   Procedure Laterality Date    APPENDECTOMY  03/28/2018    Appendectomy    CATARACT EXTRACTION  03/28/2018    Cataract Surgery    HERNIA REPAIR  03/28/2018    Hernia Repair    TONSILLECTOMY  03/28/2018    Tonsillectomy     No family history on file.  Social History     Tobacco Use    Smoking status: Not on file    Smokeless tobacco: Not on file   Substance Use Topics    Alcohol use: Not on file    Drug use: Not on file       Physical Exam   ED Triage Vitals   Temp Pulse Resp BP   -- -- -- --      SpO2 Temp src Heart Rate Source Patient Position   -- -- -- --      BP Location FiO2 (%)     -- --       Physical Exam      ED Course & MDM   Diagnoses as of 11/11/24 1157   Head injury, initial encounter   Fall, initial encounter   Closed nondisplaced fracture of second cervical vertebra, unspecified fracture morphology, initial encounter   Laceration of scalp, initial encounter        Patient updated on plan for lab testing, IV insertion, radiology imaging, and medications to be administered while in the ER (if indicated). Patient updated on expected wait times for testing and results. Patient provided my name and told to ask any staff member for questions or concerns if they should arise. Electronic medical record reviewed.     MDM    Upon initial assessment, patient was healthy non-toxic appearing and in no apparent distress.     Patient presented to the emergency department with the chief complaint fall. Head is normocephalic, patient does have a scalp hematoma near the superior aspect of his laceration near the left frontal/parietal scalp.  Patient's airway is patent.  Tympanic membranes are clear bilaterally.  Nasal mucosa clear.  Mouth with normal mucosa.  Throat is not erythematous and there are no oropharyngeal exudates, uvula is midline.  Patient has a large approximate 12 cm gaping laceration that starts in the left frontal scalp and extends to his forehead.  No active bleeding.  No  foreign bodies.Skin is normal color for race and is warm, dry. Patient has a few small less than 1 cm skin tears over the dorsal aspect of the left hand near the fifth metacarpal neck and a few less than 1 cm skin tears over the dorsal aspect of the right hand near the base of the thumb.  No active bleeding.  No foreign bodies.  Examination of the patient's heart and lungs is unremarkable.  No neurological deficits.  NIH is 0.  On arrival to the emergency department, vital signs were within normal limits    Will obtain basic blood work and a coagulation screen.  Will obtain CT of the head, neck, maxillofacial bones, chest x-ray and pelvic x-ray.  Patient's wound will be cleaned by the nursing staff and the patient's tetanus will be updated.    Patient's blood work is all similar to baseline including his kidney function and anemia.  Chest x-ray was unremarkable however x-ray of the pelvis revealed a questionable right sided pelvic fracture.  Suspicion for this is low given that the patient has no reproducible tenderness and is walking without difficulty or discomfort.  Ortho was consulted and they agree that this is unlikely given the patient is not having any pain and is ambulating without difficulty.  They did give any reassurance that this injury would be addressed as weightbearing as tolerated.    CT of the head and maxillofacial bones revealed no traumatic injury.  CT of the C-spine did reveal a minimally displaced indeterminate age fracture of the base of the dens as well as a nondisplaced fracture of the left lamina of C1.  Patient was placed in a c-collar as soon as he presented to the emergency department.    I did speak to Dr. Schneider with our trauma team who agreed that we need to consult with the trauma and neurosurgery team at [facility].  I did discuss this case with Dr. Chapman and Dr. Charles with the trauma team at [facility] who recommended speaking to Dr. Rodriguez with neurosurgery before deciding next plan  of care.  I did speak to Dr. Rodriguez who reassured me that the patient does not need to be transferred at this time and that he can follow-up as an outpatient given that he is neurovascularly intact and has no neurological deficits.  Confirmed that the patient does not require any surgical intervention at this time.  Patient was transition from a c-collar to a soft collar. He will follow-up with Dr. Rodriguez as an outpatient.  He feels well and feels comfortable going home. He is able to walk without difficulty and has no neurological deficits.  Patient's laceration was addressed using sutures, additional details found in the procedure note.  I discussed wound care including when to have sutures removed.  The skin tears were supported with skin glue.  He was given dressings for both his scalp and forehead laceration and his hand skin tears.  He will follow-up with his PCP.  Patient to be discharged with Tylenol and bacitracin.  All questions and concerns addressed.  Reasons to return to ER discussed.  Patient verbalized understanding and agreement with the treatment plan and they remained hemodynamically stable in the ER.      Multilevel productive/degenerative changes.    This note was dictated using a speech recognition program.  While an attempt was made at proof-reading to minimize errors, minor errors in transcription may be present         No data recorded                                 Medical Decision Making      Shared TATIANNA Attestation:    This patient was seen by the advanced practice provider.  I personally saw the patient and made/approved the management plan and take responsibility for the patient management.    History: 89-year-old male presents after fall with head injury.    Exam: Regular rate and rhythm cardiac exam with clear breath sounds bilaterally.  Abdomen is soft and nontender.  Neurological exam is grossly intact.  There is a large gaping scalp laceration with bleeding controlled.  There are  also some skin tears to the left and right hand.    MDM: Multisystem trauma    Labs Reviewed   CBC WITH AUTO DIFFERENTIAL   COMPREHENSIVE METABOLIC PANEL   COAGULATION SCREEN       CT head wo IV contrast   Final Result   No acute intracranial hemorrhage or mass-effect. Laceration and   hematoma/contusion in the left frontal scalp.        MACRO:   None.        Signed by: Tabby Corea 11/11/2024 10:24 AM   Dictation workstation:   [id number]      CT cervical spine wo IV contrast   Final Result   Minimally distracted indeterminate age fracture of the base of the   dens, possibly nonacute given ill-defined margins and lack of   prevertebral soft tissue swelling, and nondisplaced fracture of the   left lamina of C1.        Multilevel productive/degenerative changes.        MACRO:   None.        Signed by: Tabby Corea 11/11/2024 10:33 AM   Dictation workstation:   [id number]      XR chest 1 view   Final Result   No definite acute cardiopulmonary process radiographically.   Pacemaker/AICD in place.        MACRO:   None.        Signed by: Tabby Corea 11/11/2024 10:25 AM   Dictation workstation:   [id number]      XR pelvis 1-2 views   Final Result   Questionable nondisplaced right ischial fracture seen on one view.        MACRO:   None.        Signed by: Tabby Corea 11/11/2024 10:21 AM   Dictation workstation:   [id number]      CT maxillofacial bones wo IV contrast    (Results Pending)   CT 3D reconstruction    (Results Pending)         I have seen and examined the patient, agree with the workup, evaluation, medical decision making, management and diagnosis.  The care plan has been discussed.    Mikhail Cartagena MD      Procedure  Laceration Repair    Performed by: Vaibhav Charles PA-C  Authorized by: Mikhail CAMPBELL MD    Consent:     Consent obtained:  Verbal    Consent given by:  Patient    Risks, benefits, and alternatives were discussed: yes      Alternatives discussed:  No treatment  Universal  protocol:     Procedure explained and questions answered to patient or proxy's satisfaction: yes      Patient identity confirmed:  Verbally with patient  Anesthesia:     Anesthesia method:  Local infiltration    Local anesthetic:  Lidocaine 1% w/o epi  Laceration details:     Location:  Face    Face location:  Forehead    Length (cm):  12  Pre-procedure details:     Preparation:  Patient was prepped and draped in usual sterile fashion  Exploration:     Wound extent: no areolar tissue violation noted, no fascia violation noted, no foreign bodies/material noted, no muscle damage noted, no nerve damage noted, no tendon damage noted, no underlying fracture noted and no vascular damage noted      Contaminated: no    Treatment:     Area cleansed with:  Chlorhexidine    Amount of cleaning:  Standard    Layers repaired: skin.  Skin repair:     Repair method:  Sutures    Suture size:  4-0    Suture technique:  Simple interrupted    Number of sutures:  16  Approximation:     Approximation:  Close  Repair type:     Repair type:  Intermediate  Post-procedure details:     Dressing:  Non-adherent dressing    Procedure completion:  Tolerated       Vaibhav Charles PA-C  11/11/24 1205       Vaibhav Charles PA-C  11/11/24 1219       Vaibhav Charles PA-C  11/11/24 1225       Vaibhav Charles PA-C  11/11/24 1233

## 2024-11-13 ENCOUNTER — APPOINTMENT (OUTPATIENT)
Dept: ORTHOPEDIC SURGERY | Facility: CLINIC | Age: 89
End: 2024-11-13
Payer: MEDICARE

## 2024-11-19 ENCOUNTER — OFFICE VISIT (OUTPATIENT)
Dept: ORTHOPEDIC SURGERY | Facility: CLINIC | Age: 89
End: 2024-11-19
Payer: MEDICARE

## 2024-11-19 DIAGNOSIS — S12.110A: ICD-10-CM

## 2024-11-19 DIAGNOSIS — S12.091A OTHER NONDISPLACED FRACTURE OF FIRST CERVICAL VERTEBRA, INITIAL ENCOUNTER FOR CLOSED FRACTURE: ICD-10-CM

## 2024-11-19 PROCEDURE — 99203 OFFICE O/P NEW LOW 30 MIN: CPT | Performed by: FAMILY MEDICINE

## 2024-11-19 PROCEDURE — 99213 OFFICE O/P EST LOW 20 MIN: CPT | Performed by: FAMILY MEDICINE

## 2024-11-19 RX ORDER — ACETAMINOPHEN 500 MG
1000 TABLET ORAL EVERY 8 HOURS PRN
Qty: 30 TABLET | Refills: 0 | Status: SHIPPED | OUTPATIENT
Start: 2024-11-19 | End: 2024-11-29

## 2024-11-19 NOTE — PROGRESS NOTES
Acute Injury New Patient Visit    CC:   Chief Complaint   Patient presents with    Neck - Pain       HPI: Mri is a 89 y.o.male who presents today with new complaints of difficulty obtaining hospital follow-up.  Patient had fall injury where he was diagnosed with C2 fracture of the dens.  He was placed in a soft collar neurosurgery was consulted in the emergency department and asked to follow-up.  He presents here today with family as they have not been able to manage successful follow-up with neurosurgery.  Does sound as if this injury was not operative.  Patient also sustained laceration and has follow-up scheduled with his primary care team.        Review of Systems   GENERAL: Negative for malaise, significant weight loss, fever  MUSCULOSKELETAL: See HPI  NEURO: Negative for numbness / tingling     Past Medical History  Past Medical History:   Diagnosis Date    Benign prostatic hyperplasia without lower urinary tract symptoms     Enlarged prostate without lower urinary tract symptoms (luts)    Encounter for general adult medical examination without abnormal findings     Health care maintenance    Gastro-esophageal reflux disease with esophagitis, without bleeding     Gastroesophageal reflux disease with esophagitis    Incontinence without sensory awareness     Urinary incontinence without sensory awareness    Localized swelling, mass and lump, unspecified upper limb     Axillary mass    Other conditions influencing health status     History of cough    Other nonspecific abnormal finding of lung field     Lung mass    Personal history of diseases of the skin and subcutaneous tissue     History of atopic dermatitis    Personal history of non-Hodgkin lymphomas 05/13/2019    History of lymphoma    Personal history of other diseases of the circulatory system     History of hypertension    Personal history of other diseases of the digestive system     History of hiatal hernia    Personal history of other diseases  of the musculoskeletal system and connective tissue     History of back pain    Personal history of other diseases of the nervous system and sense organs 06/08/2020    History of restless legs syndrome    Personal history of other drug therapy     History of drug therapy    Personal history of other endocrine, nutritional and metabolic disease     History of hyperlipidemia    Personal history of other specified conditions     History of abdominal pain    Personal history of other specified conditions     History of urinary incontinence    Personal history of other specified conditions 05/13/2019    History of insomnia    Personal history of urinary (tract) infections     History of urinary tract infection    Unspecified osteoarthritis, unspecified site     Osteoarthritis       Medication review  Medication Documentation Review Audit       Reviewed by Yannick Moore RN (Registered Nurse) on 11/11/24 at 1730      Medication Order Taking? Sig Documenting Provider Last Dose Status   acetaminophen (Tylenol) 500 mg tablet [id number]  Take 2 tablets (1,000 mg) by mouth every 6 hours if needed for mild pain (1 - 3) for up to 7 days. Vaibhav Charles PA-C  Active   bacitracin 500 unit/gram ointment [id number]  Apply 1 Application topically 2 times a day for 5 days. Vaibhav Charles PA-C  Active                    Allergies  No Known Allergies    Social History  Social History     Socioeconomic History    Marital status:      Spouse name: Not on file    Number of children: Not on file    Years of education: Not on file    Highest education level: Not on file   Occupational History    Not on file   Tobacco Use    Smoking status: Never     Passive exposure: Never    Smokeless tobacco: Never   Vaping Use    Vaping status: Never Used   Substance and Sexual Activity    Alcohol use: Defer    Drug use: Never    Sexual activity: Defer   Other Topics Concern    Not on file   Social History Narrative    Not on file     Social  Drivers of Health     Financial Resource Strain: Low Risk  (2/6/2024)    Received from [facility]    Overall Financial Resource Strain (CARDIA)     Difficulty of Paying Living Expenses: Not very hard   Food Insecurity: No Food Insecurity (4/1/2024)    Received from [facility]    Hunger Vital Sign     Worried About Running Out of Food in the Last Year: Never true     Ran Out of Food in the Last Year: Never true   Transportation Needs: No Transportation Needs (4/1/2024)    Received from [facility]    PRAPARE - Transportation     Lack of Transportation (Medical): No     Lack of Transportation (Non-Medical): No   Physical Activity: Not on file   Stress: Not on file   Social Connections: Not on file   Intimate Partner Violence: Not on file   Housing Stability: Low Risk  (4/1/2024)    Received from [facility]    Housing Stability Vital Sign     Unable to Pay for Housing in the Last Year: No     Number of Places Lived in the Last Year: 1     Unstable Housing in the Last Year: No       Surgical History  Past Surgical History:   Procedure Laterality Date    APPENDECTOMY  03/28/2018    Appendectomy    CATARACT EXTRACTION  03/28/2018    Cataract Surgery    HERNIA REPAIR  03/28/2018    Hernia Repair    TONSILLECTOMY  03/28/2018    Tonsillectomy       Physical Exam:  GENERAL:  Patient is awake, alert, and oriented to person place and time.  Patient appears well nourished and well kept.  Affect Calm, Not Acutely Distressed.  HEENT: Traumatic with significant laceration to the forehead and scalp with well-approximated sutures swelling and bruising noted to the left side of the face.  CARDIOVASCULAR:  Hemodynamically stable.  RESPIRATORY:  Normal respirations with unlabored breathing.  NEURO: Sensation intact to the upper extremities  Extremity: Upper extremities demonstrate  which is equal and symmetric.  Patient appears to be  comfortable in the soft collar.      Diagnostics: None today, previous multiple imaging reviewed        Procedure: None  Procedures    Assessment:   Problem List Items Addressed This Visit    None  Visit Diagnoses       Other nondisplaced fracture of first cervical vertebra, initial encounter for closed fracture        Relevant Medications    acetaminophen (Tylenol Extra Strength) 500 mg tablet    Other Relevant Orders    Referral to Spine Surgery    Anterior displaced type ii dens fracture, initial encounter for closed fracture (Multi)        Relevant Medications    acetaminophen (Tylenol Extra Strength) 500 mg tablet    Other Relevant Orders    Referral to Spine Surgery             Plan: At this time we will refer the patient to spine surgery, Dr. Donnell Rodriguez who was consulted.  Patient had requested a refill of Tylenol, we can accommodate this easily for him today, prescription sent to the pharmacy.  At this time we will not make any recommendations for modifying or adjusting the  soft cervical collar.  Will defer any further evaluation or management to Dr. Rodriguez going forward.  Orders Placed This Encounter    Referral to Spine Surgery    acetaminophen (Tylenol Extra Strength) 500 mg tablet      At the conclusion of the visit there were no further questions by the patient/family regarding their plan of care.  Patient was instructed to call or return with any issues, questions, or concerns regarding their injury and/or treatment plan described above.     11/19/24 at 5:09 PM - Cole C Budinsky, MD    Office: [phone]    This note was prepared using voice recognition software.  The details of this note are correct and have been reviewed, and corrected to the best of my ability.  Some grammatical errors may persist related to the Dragon software.